# Patient Record
Sex: MALE | Race: WHITE | Employment: OTHER | URBAN - METROPOLITAN AREA
[De-identification: names, ages, dates, MRNs, and addresses within clinical notes are randomized per-mention and may not be internally consistent; named-entity substitution may affect disease eponyms.]

---

## 2018-06-07 ENCOUNTER — HOSPITAL ENCOUNTER (EMERGENCY)
Facility: CLINIC | Age: 28
Discharge: HOME | End: 2018-06-07
Attending: PHYSICIAN ASSISTANT | Admitting: PHYSICIAN ASSISTANT

## 2018-06-07 VITALS
TEMPERATURE: 98.8 F | RESPIRATION RATE: 18 BRPM | SYSTOLIC BLOOD PRESSURE: 136 MMHG | DIASTOLIC BLOOD PRESSURE: 82 MMHG | OXYGEN SATURATION: 98 % | WEIGHT: 167.55 LBS

## 2018-06-07 DIAGNOSIS — H61.21 IMPACTED CERUMEN OF RIGHT EAR: ICD-10-CM

## 2018-06-07 PROCEDURE — 99282 EMERGENCY DEPT VISIT SF MDM: CPT

## 2018-06-07 PROCEDURE — 69210 REMOVE IMPACTED EAR WAX UNI: CPT

## 2018-06-07 ASSESSMENT — ENCOUNTER SYMPTOMS
FEVER: 0
SORE THROAT: 0
CHILLS: 0
RHINORRHEA: 0

## 2018-06-07 NOTE — ED AVS SNAPSHOT
[facility] Emergency Department    [address] [facility] [address]    Phone:  [phone]    Fax:  [phone]                                       Leland Summers   MRN: [id number]    Department:  [facility] Emergency Department   Date of Visit:  6/7/2018           Patient Information     Date Of Birth          1990        Your diagnoses for this visit were:     Impacted cerumen of right ear        You were seen by Ely Ortiz PA-C.      Follow-up Information     Follow up with Primary care In 2 days.    Why:  As needed        Follow up with [facility] Emergency Department.    Specialty:  EMERGENCY MEDICINE    Why:  As needed, If symptoms worsen    Contact information:    [address] [facility] [id number] [phone]        Discharge Instructions         Cera En Los Oídos [Earwax, Cerumen Impaction, Home Care]    Todo el yaneth produce cera en el revestimiento del conducto auditivo para lubricar y proteger el oído. La cera que se forma en el conducto auditivo de manera natural se desplaza hacia el exterior y sale del oído. Algunas veces la acumulación de cera en el conducto auditivo puede causar un bloqueo y pérdida de audición. A continuación se dan algunas instrucciones para el tratamiento en la casa.  Cuidados En [place]:  Si el médico le ha aconsejado que elimine un tapón de cera en el oído, siga estas instrucciones:  1. A menos que le hayan recetado un medicamento, puede usar shari de los productos de venta sin receta para la limpieza del oído (moises Debrox o Murine Earwax Drops). Estos productos contienen peróxido de carbamida y pueden comprarse sin receta. Acuéstese de lado con el oído bloqueado hacia arriba. Aplique el contenido de un cuentagotas entero y espere varios minutos. Mueva con el dedo el oído externo para que la solución penetre en el conducto auditivo.  2. Inclínese sobre un lavabo o un  "recipiente con el oído bloqueado hacia abajo. Utilice oscar ranjith de succión de goma, llena de agua TIBIA, para enjuagar el oído varias veces. Aplique solamente oscar presión ligera.  3. Si tiene dificultad para extraer el agua del oído, ponga unas gotas de alcohol de fricción (alcohol isopropílico) en el conducto auditivo. [place] le ayudará a eliminar el agua restante.  4. Repita ean procedimiento oscar vez al día bin un pako de anselmo días o hasta que baker audición regrese a la normalidad. No use ean tratamiento bin más de anselmo días seguidos.  No [place] Siguiente    NO use agua fría para enjuagar el oído, ya que esto le producirá mareo.    NO lleve a cabo ean procedimiento si tiene oscar infección en el oído.    NO lleve a cabo ean procedimiento si tiene oscar ruptura del tímpano.    NO use aplicadores o palillos de algodón, fósforos, palillos de dientes, horquillas para el pelo (pasadores), llaves u otros objetos para \"limpiar\" el oído. [place] puede provocar oscar infección del conducto auditivo o la ruptura del tímpano. Debido a baker tamaño y forma, los aplicadores y palillos de algodón a menudo empujan la cera más profundamente en el conducto auditivo en vez de extraerla, lo cual puede empeorar aún más las cosas.  Programe oscar VISITA DE CONTROL con baker médico o con ean centro si baker bloqueo no mejora después de anselmo intentos de limpieza.  Busque Prontamente Atención Médica  si algo de lo siguiente ocurre:    Dolor de oído en aumento    Fiebre de 100.4 F (38 C) o más radha, o moises le haya indicado baker proveedor de atención médica    Baker audición no se normaliza al cabo de anselmo días de tratamiento    [place] de líquido o bonnie por el conducto auditivo    Hinchazón, enrojecimiento o sensibilidad en el oído externo    Dolor de keanu o de venecia, o rigidez en el venecia    8242-3949 [employer] [address] [place] [address]. Todos los derechos reservados. Esta información no pretende sustituir la " atención médica profesional. Sólo baker médico puede diagnosticar y tratar un problema de cleopatra.          Cera de oído retenida  La cera de oído retenida se produce por la acumulación de la cera natural del oído (cerumen). Es muy común que quede cera de oído retenida. [place] puede causar síntomas, moises pérdida de la audición. También puede dificultar que un médico examine baker oído.  Qué es el cerumen  En baker oído existen glándulas muy pequeñas que producen sustancias que, combinadas con las células de la piel muertas, ned la cera del oído (cerumen). Esta cera ayuda a proteger baker canal auditivo del agua, la suciedad, las infecciones y las lesiones (heridas). Con el tiempo, el cerumen viaja del interior de baker canal auditivo hasta la entrada de ean canal. Y luego se va naturalmente. Sin embargo, en algunos casos, no logra llegar a la entrada de diane canal. [place] puede deberse a un problema de cleopatra o a objetos introducidos en el oído. A medida que la persona envejece, el cerumen tiende a hacerse más mason y menos fluido. Los adultos de mayor edad tienen más probabilidades de tener problemas de acumulación de cerumen.   Qué causa la retención de cerumen?  Muchas afecciones pueden provocar la acumulación de cera en el oído. Algunas causan un bloqueo físico. Otras provocan la producción excesiva de cerumen. Los problemas de cleopatra que pueden causar la acumulación de cera en el oído son, por ejemplo:    Uso de bastoncillos o cotonetes de algodón para limpiar en lo profundo del canal auditivo    Bloqueo óseo (hueso) en el oído (osteoma o exostosis)    Infecciones, moises del oído externo (otitis externa)    Enfermedades de la piel, moises eccema    Enfermedades autoinmunitarias, moises el lupus    Canal auditivo estrechado de nacimiento, por inflamación crónica o lesión    Exceso de cerumen debido a osacr lesión    Exceso de cerumen debido a la presencia de agua en el canal auditivo  Introducir objetos repetidas veces en el oído también  pueden provocar la retención de cerumen. Por ejemplo, poner bastoncillos de algodón en el oído puede empujar el cerumen más adentro del oído. Con el paso del tiempo, esto puede causar un bloqueo. Los audífonos, los tapones para nadar y los tapones para nadar a medida pueden causar el mismo problema si se los usa con frecuencia.  En algunos casos, la causa del cerumen retenido se desconoce.  Síntomas de cerumen retenido  El exceso de cera en el oído generalmente no causa ningún síntoma, a menos que haya oscar gran cantidad acumulada. Entonces puede causar síntomas moises:    Pérdida de la audición    Dolor de oído    Sensación de llenura en el oído    Picazón en el oído    Olor proveniente del oído    Secreción del oído    Mareos    Zumbidos en los oídos    Tos  Tratamiento del cerumen retenido  Si usted no tiene síntomas, es posible que no necesite tratamiento. Es frecuente que la cera de oído se vaya por sí pavel con el tiempo. Si usted sí tiene síntomas, es posible que reciba shari o más de los siguientes tratamientos:    Gotas para los oídos. Ayudan a ablandar el cerumen. [place] contribuye a que la cera se vaya yendo con el tiempo.    Enjuague (irrigación) del canal auditivo con agua. Huma procedimiento se realiza en el consultorio del médico.    Extracción del cerumen con herramientas pequeñas. También se realiza en el consultorio del médico.  En casos aislados, algunos tratamientos para extraer el cerumen pueden causar complicaciones moises:    Infección del oído externo (otitis externa)    Dolor de oído    Pérdida de la audición por un período corto    Mareos    Agua atrapada en el canal auditivo    Perforación (agujero) en el tímpano    Zumbidos en los oídos    Sangrado del oído  Consulte a baker proveedor de atención médica sobre cuáles son los riesgos más probables en baker rossi.     No use lo siguiente en casa  Los proveedores de atención médica desaconsejan el uso de michelle para limpiar los oídos y de equipos de vacío para  el oído. No está probado que estos métodos funcionen y pueden causar problemas.   Prevención de la retención de cerumen  Es posible que usted no pueda prevenir que el cerumen quede retenido si usted tiene oscar afección que lo provoca, moises por ejemplo, eccema. En otros casos, es posible que usted pueda evitar la acumulación de cerumen:    Usando gotas para los oídos oscar vez por semana.    Haciendo oscar limpieza de rutina del oído cada seis meses.    Evitando usar hisopos de algodón en el oído.     Cuándo llamar al proveedor de atención médica  Llame a baker proveedor de atención médica si tiene síntomas de retención de cerumen. Comuníquese tambien enseguida con baker proveedor si tiene síntomas graves después de oscar extracción de cerumen. Por ejemplo, sangrado o dolor de oído blayne.   Date Last Reviewed: 10/10/2017    2605-4639 [employer]. [address] [place] [address]. All rights reserved. This information is not intended as a substitute for professional medical care. Always follow your healthcare professional's instructions.          24 Hour Appointment Hotline       To make an appointment at any [facility], call [phone] ([phone]). If you don't have a family doctor or clinic, we will help you find one. [facility] clinics are conveniently located to serve the needs of you and your family.             Review of your medicines      Notice     You have not been prescribed any medications.            Orders Needing Specimen Collection     None      Pending Results     No orders found from 6/5/2018 to 6/8/2018.            Pending Culture Results     No orders found from 6/5/2018 to 6/8/2018.            Pending Results Instructions     If you had any lab results that were not finalized at the time of your Discharge, you can call the ED Lab Result RN at [phone]. You will be contacted by this team for any positive Lab results or changes in treatment. The nurses are available 7 days  a week from 10A to 6:30P.  You can leave a message 24 hours per day and they will return your call.        Test Results From Your Hospital Stay               Clinical Quality Measure: Blood Pressure Screening     Your blood pressure was checked while you were in the emergency department today. The last reading we obtained was  BP: 136/82 . Please read the guidelines below about what these numbers mean and what you should do about them.  If your systolic blood pressure (the top number) is less than 120 and your diastolic blood pressure (the bottom number) is less than 80, then your blood pressure is normal. There is nothing more that you need to do about it.  If your systolic blood pressure (the top number) is 120-139 or your diastolic blood pressure (the bottom number) is 80-89, your blood pressure may be higher than it should be. You should have your blood pressure rechecked within a year by a primary care provider.  If your systolic blood pressure (the top number) is 140 or greater or your diastolic blood pressure (the bottom number) is 90 or greater, you may have high blood pressure. High blood pressure is treatable, but if left untreated over time it can put you at risk for heart attack, stroke, or kidney failure. You should have your blood pressure rechecked by a primary care provider within the next 4 weeks.  If your provider in the emergency department today gave you specific instructions to follow-up with your doctor or provider even sooner than that, you should follow that instruction and not wait for up to 4 weeks for your follow-up visit.        Thank you for choosing [facility]       Thank you for choosing [facility] for your care. Our goal is always to provide you with excellent care. Hearing back from our patients is one way we can continue to improve our services. Please take a few minutes to complete the written survey that you may receive in the mail after you visit with us. Thank you!        [place]  "Information     [employer] lets you send messages to your doctor, view your test results, renew your prescriptions, schedule appointments and more. To sign up, go to [email or web address] . Click on \"Log in\" on the left side of the screen, which will take you to the Welcome page. Then click on \"Sign up Now\" on the right side of the page.     You will be asked to enter the access code listed below, as well as some personal information. Please follow the directions to create your username and password.     Your access code is: [email or web address]  Expires: 2018  6:01 PM     Your access code will  in 90 days. If you need help or a new code, please call your [place] clinic or [phone].        Care EveryWhere ID     This is your Care EveryWhere ID. This could be used by other organizations to access your [facility] medical records  [id number]        Equal Access to Services     CAROL MARTINEZ AH: Hadii isis browningo Sokassy, waaxda luvince, qaybta kaalmada adecarmelita, juliana ogden . So [facility] [phone].    ATENCIÓN: Si habla español, tiene a baker disposición servicios gratuitos de asistencia lingüística. Llame al [phone].    We comply with applicable federal civil rights laws and [place] laws. We do not discriminate on the basis of race, color, national origin, age, disability, sex, sexual orientation, or gender identity.            After Visit Summary       This is your record. Keep this with you and show to your community pharmacist(s) and doctor(s) at your next visit.                  "

## 2018-06-07 NOTE — ED AVS SNAPSHOT
[facility] Emergency Department    [address]    [facility] [address]    Phone:  [phone]    Fax:  [phone]                                       Leland Summers   MRN: [id number]    Department:  [facility] Emergency Department   Date of Visit:  6/7/2018           After Visit Summary Signature Page     I have received my discharge instructions, and my questions have been answered. I have discussed any challenges I see with this plan with the nurse or doctor.    ..........................................................................................................................................  Patient/Patient Representative Signature      ..........................................................................................................................................  Patient Representative Print Name and Relationship to Patient    ..................................................               ................................................  Date                                            Time    ..........................................................................................................................................  Reviewed by Signature/Title    ...................................................              ..............................................  Date                                                            Time

## 2018-06-07 NOTE — DISCHARGE INSTRUCTIONS
Cera En Los Oídos [Earwax, Cerumen Impaction, Home Care]    Todo el yaneth produce cera en el revestimiento del conducto auditivo para lubricar y proteger el oído. La cera que se forma en el conducto auditivo de manera natural se desplaza hacia el exterior y sale del oído. Algunas veces la acumulación de cera en el conducto auditivo puede causar un bloqueo y pérdida de audición. A continuación se dan algunas instrucciones para el tratamiento en la casa.  Cuidados En [place]:  Si el médico le ha aconsejado que elimine un tapón de cera en el oído, siga estas instrucciones:  1. A menos que le hayan recetado un medicamento, puede usar shari de los productos de venta sin receta para la limpieza del oído (moises Debrox o Murine Earwax Drops). Estos productos contienen peróxido de carbamida y pueden comprarse sin receta. Acuéstese de lado con el oído bloqueado hacia arriba. Aplique el contenido de un cuentagotas entero y espere varios minutos. Mueva con el dedo el oído externo para que la solución penetre en el conducto auditivo.  2. Inclínese sobre un lavabo o un recipiente con el oído bloqueado hacia abajo. Utilice oscar ranjith de succión de goma, llena de agua TIBIA, para enjuagar el oído varias veces. Aplique solamente oscar presión ligera.  3. Si tiene dificultad para extraer el agua del oído, ponga unas gotas de alcohol de fricción (alcohol isopropílico) en el conducto auditivo. [place] le ayudará a eliminar el agua restante.  4. Repita ean procedimiento oscar vez al día bin un pako de anselmo días o hasta que baker audición regrese a la normalidad. No use ean tratamiento bin más de anselmo días seguidos.  No Billy Lo Siguiente    NO use agua fría para enjuagar el oído, ya que esto le producirá mareo.    NO lleve a cabo ean procedimiento si tiene oscar infección en el oído.    NO lleve a cabo ean procedimiento si tiene oscar ruptura del tímpano.    NO use aplicadores o palillos de algodón, fósforos, palillos de dientes, horquillas  "para el pelo (pasadores), huma u otros objetos para \"limpiar\" el oído. [place] puede provocar oscar infección del conducto auditivo o la ruptura del tímpano. Debido a baker tamaño y forma, los aplicadores y palillos de algodón a menudo empujan la cera más profundamente en el conducto auditivo en vez de extraerla, lo cual puede empeorar aún más las cosas.  Programe oscar VISITA DE CONTROL con baker médico o con ean centro si baker bloqueo no mejora después de anselmo intentos de limpieza.  Busque Prontamente Atención Médica  si algo de lo siguiente ocurre:    Dolor de oído en aumento    Fiebre de 100.4 F (38 C) o más radha, o moises le haya indicado baker proveedor de atención médica    Baker audición no se normaliza al cabo de anselmo días de tratamiento    [place] de líquido o bonnie por el conducto auditivo    Hinchazón, enrojecimiento o sensibilidad en el oído externo    Dolor de keanu o de venecia, o rigidez en el venecia    7823-0091 [employer]. [address] [place] [address]. Todos los derechos reservados. Esta información no pretende sustituir la atención médica profesional. Sólo baker médico puede diagnosticar y tratar un problema de cleopatra.          Cera de oído retenida  La cera de oído retenida se produce por la acumulación de la cera natural del oído (cerumen). Es muy común que quede cera de oído retenida. [place] puede causar síntomas, moises pérdida de la audición. También puede dificultar que un médico examine baker oído.  Qué es el cerumen  En baker oído existen glándulas muy pequeñas que producen sustancias que, combinadas con las células de la piel muertas, ned la cera del oído (cerumen). Esta cera ayuda a proteger baker canal auditivo del agua, la suciedad, las infecciones y las lesiones (heridas). Con el tiempo, el cerumen viaja del interior de baker canal auditivo hasta la entrada de ean canal. Y luego se va naturalmente. Sin embargo, en algunos casos, no logra llegar a la entrada de diane canal. [place] puede deberse a " un problema de cleopatra o a objetos introducidos en el oído. A medida que la persona envejece, el cerumen tiende a hacerse más mason y menos fluido. Los adultos de mayor edad tienen más probabilidades de tener problemas de acumulación de cerumen.   Qué causa la retención de cerumen?  Muchas afecciones pueden provocar la acumulación de cera en el oído. Algunas causan un bloqueo físico. Otras provocan la producción excesiva de cerumen. Los problemas de cleopatra que pueden causar la acumulación de cera en el oído son, por ejemplo:    Uso de bastoncillos o cotonetes de algodón para limpiar en lo profundo del canal auditivo    Bloqueo óseo (hueso) en el oído (osteoma o exostosis)    Infecciones, moises del oído externo (otitis externa)    Enfermedades de la piel, moises eccema    Enfermedades autoinmunitarias, moises el lupus    Canal auditivo estrechado de nacimiento, por inflamación crónica o lesión    Exceso de cerumen debido a oscar lesión    Exceso de cerumen debido a la presencia de agua en el canal auditivo  Introducir objetos repetidas veces en el oído también pueden provocar la retención de cerumen. Por ejemplo, poner bastoncillos de algodón en el oído puede empujar el cerumen más adentro del oído. Con el paso del tiempo, esto puede causar un bloqueo. Los audífonos, los tapones para nadar y los tapones para nadar a medida pueden causar el mismo problema si se los usa con frecuencia.  En algunos casos, la causa del cerumen retenido se desconoce.  Síntomas de cerumen retenido  El exceso de cera en el oído generalmente no causa ningún síntoma, a menos que haya oscar gran cantidad acumulada. Entonces puede causar síntomas moises:    Pérdida de la audición    Dolor de oído    Sensación de llenura en el oído    Picazón en el oído    Olor proveniente del oído    Secreción del oído    Mareos    Zumbidos en los oídos    Tos  Tratamiento del cerumen retenido  Si usted no tiene síntomas, es posible que no necesite tratamiento. Es frecuente  que la cera de oído se vaya por sí pavel con el tiempo. Si usted sí tiene síntomas, es posible que reciba shari o más de los siguientes tratamientos:    Gotas para los oídos. Ayudan a ablandar el cerumen. [place] contribuye a que la cera se vaya yendo con el tiempo.    Enjuague (irrigación) del canal auditivo con agua. Huma procedimiento se realiza en el consultorio del médico.    Extracción del cerumen con herramientas pequeñas. También se realiza en el consultorio del médico.  En casos aislados, algunos tratamientos para extraer el cerumen pueden causar complicaciones moises:    Infección del oído externo (otitis externa)    Dolor de oído    Pérdida de la audición por un período corto    Mareos    Agua atrapada en el canal auditivo    Perforación (agujero) en el tímpano    Zumbidos en los oídos    Sangrado del oído  Consulte a baker proveedor de atención médica sobre cuáles son los riesgos más probables en baker rossi.     No use lo siguiente en casa  Los proveedores de atención médica desaconsejan el uso de michelle para limpiar los oídos y de equipos de vacío para el oído. No está probado que estos métodos funcionen y pueden causar problemas.   Prevención de la retención de cerumen  Es posible que usted no pueda prevenir que el cerumen quede retenido si usted tiene oscar afección que lo provoca, moises por ejemplo, eccema. En otros casos, es posible que usted pueda evitar la acumulación de cerumen:    Usando gotas para los oídos oscar vez por semana.    Haciendo oscar limpieza de rutina del oído cada seis meses.    Evitando usar hisopos de algodón en el oído.     Cuándo llamar al proveedor de atención médica  Llame a baker proveedor de atención médica si tiene síntomas de retención de cerumen. Comuníquese tambien enseguida con baker proveedor si tiene síntomas graves después de oscar extracción de cerumen. Por ejemplo, sangrado o dolor de oído blayne.   Date Last Reviewed: 10/10/2017    8603-9496 The [employer]. [address] [place] [address]  Road, [place] [address]. All rights reserved. This information is not intended as a substitute for professional medical care. Always follow your healthcare professional's instructions.

## 2018-06-07 NOTE — ED PROVIDER NOTES
History     Chief Complaint:  Otalgia    A virtual professional  was used, as the patient's primary language is not English.    HPI   Leland Summers is a 27 year old male with a history of otalgia who presents to the emergency department for evaluation of otalgia. The patient reports he has had right ear pain for the past 8 days. He states that the right ear is congested, causing difficulty to hearing. He indicates that here at the ED, he can hear relatively well, but with exertion the congestion is exacerbated and there is a buzzing in his right ear, followed by a mild headache. The patient denies any ear drainage, fever, chill, sore throat, rhinorrhea, or congestion. He does report a history of the same, but indicates he has not been seen at a hospital for this.    Allergies:  NKDA    Medications:    The patient is currently on no regular medications.    Past Medical History:    The patient denies any significant past medical history.    Past Surgical History:    The patient does not have any pertinent past surgical history.    Family History:    No past pertinent family history.    Social History:  Presents with friend.  Primary language is Y[place]ut.  Lives in [place].      Review of Systems   Constitutional: Negative for chills and fever.   HENT: Positive for ear pain and hearing loss. Negative for congestion, rhinorrhea and sore throat.    All other systems reviewed and are negative.    Physical Exam     Patient Vitals for the past 24 hrs:   BP Temp Temp src Heart Rate Resp SpO2 Weight   06/07/18 1615 136/82 98.8  F (37.1  C) Oral 66 18 98 % 76 kg (167 lb 8.8 oz)     Physical Exam  General: Alert, interactive.  Head:  Scalp is atraumatic.  Eyes:  EOM intact. The pupils are equal, round, and reactive to light. No scleral icterus.  ENT:                                      Ears:  The external ears are normal. Right TM with cerumen impaction. Left TM normal without erythema or  exudate. Post disimpaction, mildly erythematous canal, no exudate. TM normal. No mastoid tenderness.   Nose:  The external nose is normal.  Throat:  The oropharynx is normal. Mucus membranes are moist.                 Neck:  Normal range of motion. There is no rigidity.   MS:  Normal range of motion.   Skin:  Warm and dry.   Neuro:  Strength and sensation grossly intact.   Psych:  Awake. Alert.  Appropriate interactions.     Emergency Department Course     Emergency Department Course:  Nursing notes and vitals reviewed. 1630 I performed an exam of the patient as documented above.     1745 I rechecked the patient and discussed the results of his workup thus far.     Findings and plan explained to the Patient. Patient discharged home with instructions regarding supportive care, medications, and reasons to return. The importance of close follow-up was reviewed.    Impression & Plan      Medical Decision Making:  Leland Summers is a 27 year old male who presents with right ear fullness and exam consistent with cerumen impaction.  This was disimpacted by ED tech in the emergency department and the patient reported significant improvement in symptoms.  No sign of otitis externa nor otitis media on examination.  Patient is afebrile and well-appearing.  No further workup indicated.  Home care instructions given for home.  All questions/concerns addressed prior to discharge home.      Diagnosis:    ICD-10-CM   1. Impacted cerumen of right ear H61.21       Disposition:  discharged to home    Gael LUCAS, am serving as a scribe on 6/7/2018 at 4:30 PM to personally document services performed by Ely Ortiz PA-C based on my observations and the provider's statements to me.     Gael Chua  6/7/2018   [facility] EMERGENCY DEPARTMENT       Ely Ortiz PA-C  06/07/18 1924